# Patient Record
Sex: MALE | Race: WHITE | NOT HISPANIC OR LATINO | Employment: FULL TIME | URBAN - METROPOLITAN AREA
[De-identification: names, ages, dates, MRNs, and addresses within clinical notes are randomized per-mention and may not be internally consistent; named-entity substitution may affect disease eponyms.]

---

## 2018-03-28 PROBLEM — M47.817 SPONDYLOSIS OF LUMBOSACRAL REGION: Status: ACTIVE | Noted: 2018-03-28

## 2018-07-03 ENCOUNTER — OFFICE VISIT (OUTPATIENT)
Dept: URGENT CARE | Facility: CLINIC | Age: 28
End: 2018-07-03
Payer: COMMERCIAL

## 2018-07-03 VITALS
HEART RATE: 60 BPM | DIASTOLIC BLOOD PRESSURE: 66 MMHG | TEMPERATURE: 98 F | SYSTOLIC BLOOD PRESSURE: 107 MMHG | HEIGHT: 72 IN | RESPIRATION RATE: 18 BRPM | OXYGEN SATURATION: 98 % | WEIGHT: 215 LBS | BODY MASS INDEX: 29.12 KG/M2

## 2018-07-03 DIAGNOSIS — H10.9 CONJUNCTIVITIS OF LEFT EYE, UNSPECIFIED CONJUNCTIVITIS TYPE: Primary | ICD-10-CM

## 2018-07-03 PROCEDURE — 99203 OFFICE O/P NEW LOW 30 MIN: CPT | Mod: S$GLB,,, | Performed by: PHYSICIAN ASSISTANT

## 2018-07-03 PROCEDURE — 3008F BODY MASS INDEX DOCD: CPT | Mod: CPTII,S$GLB,, | Performed by: PHYSICIAN ASSISTANT

## 2018-07-03 RX ORDER — TOBRAMYCIN 3 MG/ML
1 SOLUTION/ DROPS OPHTHALMIC EVERY 4 HOURS
Qty: 5 ML | Refills: 0 | Status: SHIPPED | OUTPATIENT
Start: 2018-07-03 | End: 2018-07-06 | Stop reason: ALTCHOICE

## 2018-07-03 NOTE — PATIENT INSTRUCTIONS
EYE   1) Use the eye drops as prescribed while awake initially. Use the eye drops for 24 hours after the last day of eye symptoms.  You are contagious until you have been on abx for at least 24 hours.  2) Wash your hands regularly to help prevent the spread of infection.  3) It is also a good idea to change your pillow case each morning until you are symptom free to help prevent spread of infection.  4) Do not wear your contact lens (if you use them) for at least 5 days after you stop having symptoms and are rechecked by your doctor. Throw away the contacts, contact solution and carrying case you were using and start with new material. You may also need to throw away any eye makeup/mascara that you used while your eye was irritated.  5) If you develop worsening eye symptoms or change in your vision, seek medical care immediately, either with your ophthalomologist or the ER.  6) If your condition fails to improve in a timely manner, you should receive another evaluation by your Primary Care Provider/Pediatrician to discuss your concerns or return to urgent care for a recheck.  If your condition worsens at any time, you should report immediately to your nearest Emergency Department for further evaluation. **You must understand that you have received Urgent Care treatment only and that you may be released before all of your medical problems are known or treated. You, the patient, are responsible to arrange for follow-up care as instructed.       Conjunctivitis, Nonspecific    The membrane that covers the white part of your eye (the conjunctiva) is inflamed. Inflammation happens when your body responds to an injury, allergic reaction, infection, or illness. Symptoms of inflammation in the eye may include redness, irritation, itching, swelling, or burning. These symptoms should go away within the next 24 hours. Conjunctivitis may be related to a particle that was in your eye. If so, it may wash out with your tears or  irrigation treatment. Being exposed to liquid chemicals or fumes may also cause this reaction.   Home care  · Apply a cold pack (ice in a plastic bag, wrapped in a towel) over the eye for 20 minutes at a time. This will reduce pain.  · Artificial tears may be prescribed to reduce irritation or redness.  These should be used 3 to 4 times a day.  · You may use acetaminophen or ibuprofen to control pain, unless another medicine was prescribed.(Note: If you have chronic liver or kidney disease, or if you have ever had a stomach ulcer or gastrointestinal bleeding, talk with your healthcare provider before using these medicines.)  Follow-up care  Follow up with your healthcare provider, or as advised.  When to seek medical advice  Call your healthcare provider right away if any of these occur:  · Increased eyelid swelling  · Increased eye pain  · Increased redness or drainage from the eye  · Increased blurry vision or increased sensitivity to light  · Failure of normal vision to return within 24 to 48 hours  Date Last Reviewed: 6/14/2015  © 2732-4861 [employer]. [address] [place] [address], [place] [address]. All rights reserved. This information is not intended as a substitute for professional medical care. Always follow your healthcare professional's instructions.

## 2018-07-03 NOTE — PROGRESS NOTES
Subjective:       Patient ID: Seymour Brewer Jr. is a 27 y.o. male.    Vitals:  height is 6' (1.829 m) and weight is 97.5 kg (215 lb). His oral temperature is 97.8 °F (36.6 °C). His blood pressure is 107/66 and his pulse is 60. His respiration is 18 and oxygen saturation is 98%.     Chief Complaint: Eye Problem    Patient complains of left eye itching, irritation, redness, and drainage since yesterday.  He states that his eye was crusted shut this morning.  He also reports mild blurry vision.  Patient reports similar symptoms 3 weeks ago.  Patient denies any injury, trauma, or fb sensation.  He does not wear glasses or contacts.  Patient denies any other complaints at this time.        Eye Problem    The left eye is affected. This is a new problem. The current episode started yesterday. The problem occurs constantly. The problem has been gradually worsening. The pain is mild. There is no known exposure to pink eye. He does not wear contacts. Associated symptoms include blurred vision, an eye discharge, eye redness and itching. Pertinent negatives include no double vision, fever, foreign body sensation, nausea, photophobia, recent URI or vomiting. The treatment provided no relief.     Review of Systems   Constitution: Negative for chills and fever.   HENT: Negative for congestion.    Eyes: Positive for blurred vision, discharge, itching, pain (burning) and redness. Negative for double vision and photophobia.   Cardiovascular: Negative for chest pain.   Respiratory: Negative for shortness of breath.    Gastrointestinal: Negative for nausea and vomiting.   Neurological: Negative for headaches.   All other systems reviewed and are negative.      Objective:      Physical Exam   Constitutional: He is oriented to person, place, and time. He appears well-developed and well-nourished.   HENT:   Head: Normocephalic and atraumatic.   Right Ear: External ear normal.   Left Ear: External ear normal.   Nose: Nose normal.    Mouth/Throat: Oropharynx is clear and moist.   Eyes: EOM and lids are normal. Pupils are equal, round, and reactive to light. Lids are everted and swept, no foreign bodies found. Right eye exhibits no chemosis, no discharge, no exudate and no hordeolum. No foreign body present in the right eye. Left eye exhibits discharge (watery) and exudate (in lower lid). Left eye exhibits no chemosis and no hordeolum. No foreign body present in the left eye. Right conjunctiva is not injected. Right conjunctiva has no hemorrhage. Left conjunctiva is injected.   Slit lamp exam:       The left eye shows no corneal abrasion and no fluorescein uptake.   Neck: Trachea normal, full passive range of motion without pain and phonation normal. Neck supple.   Musculoskeletal: Normal range of motion.   Neurological: He is alert and oriented to person, place, and time.   Skin: Skin is warm, dry and intact.   Psychiatric: He has a normal mood and affect. His speech is normal and behavior is normal. Judgment and thought content normal. Cognition and memory are normal.   Nursing note and vitals reviewed.      Assessment:       1. Conjunctivitis of left eye, unspecified conjunctivitis type        Plan:         Conjunctivitis of left eye, unspecified conjunctivitis type  -     tobramycin sulfate 0.3% (TOBREX) 0.3 % ophthalmic solution; Place 1 drop into the left eye every 4 (four) hours. for 7 days  Dispense: 5 mL; Refill: 0      Patient Instructions   EYE   1) Use the eye drops as prescribed while awake initially. Use the eye drops for 24 hours after the last day of eye symptoms.  You are contagious until you have been on abx for at least 24 hours.  2) Wash your hands regularly to help prevent the spread of infection.  3) It is also a good idea to change your pillow case each morning until you are symptom free to help prevent spread of infection.  4) Do not wear your contact lens (if you use them) for at least 5 days after you stop having  symptoms and are rechecked by your doctor. Throw away the contacts, contact solution and carrying case you were using and start with new material. You may also need to throw away any eye makeup/mascara that you used while your eye was irritated.  5) If you develop worsening eye symptoms or change in your vision, seek medical care immediately, either with your ophthalomologist or the ER.  6) If your condition fails to improve in a timely manner, you should receive another evaluation by your Primary Care Provider/Pediatrician to discuss your concerns or return to urgent care for a recheck.  If your condition worsens at any time, you should report immediately to your nearest Emergency Department for further evaluation. **You must understand that you have received Urgent Care treatment only and that you may be released before all of your medical problems are known or treated. You, the patient, are responsible to arrange for follow-up care as instructed.       Conjunctivitis, Nonspecific    The membrane that covers the white part of your eye (the conjunctiva) is inflamed. Inflammation happens when your body responds to an injury, allergic reaction, infection, or illness. Symptoms of inflammation in the eye may include redness, irritation, itching, swelling, or burning. These symptoms should go away within the next 24 hours. Conjunctivitis may be related to a particle that was in your eye. If so, it may wash out with your tears or irrigation treatment. Being exposed to liquid chemicals or fumes may also cause this reaction.   Home care  · Apply a cold pack (ice in a plastic bag, wrapped in a towel) over the eye for 20 minutes at a time. This will reduce pain.  · Artificial tears may be prescribed to reduce irritation or redness.  These should be used 3 to 4 times a day.  · You may use acetaminophen or ibuprofen to control pain, unless another medicine was prescribed.(Note: If you have chronic liver or kidney disease, or  if you have ever had a stomach ulcer or gastrointestinal bleeding, talk with your healthcare provider before using these medicines.)  Follow-up care  Follow up with your healthcare provider, or as advised.  When to seek medical advice  Call your healthcare provider right away if any of these occur:  · Increased eyelid swelling  · Increased eye pain  · Increased redness or drainage from the eye  · Increased blurry vision or increased sensitivity to light  · Failure of normal vision to return within 24 to 48 hours  Date Last Reviewed: 6/14/2015  © 1437-2796 [employer]. [address] [place] [address], [place] [address]. All rights reserved. This information is not intended as a substitute for professional medical care. Always follow your healthcare professional's instructions.

## 2018-07-06 ENCOUNTER — TELEPHONE (OUTPATIENT)
Dept: URGENT CARE | Facility: CLINIC | Age: 28
End: 2018-07-06

## 2018-07-06 DIAGNOSIS — H10.9 CONJUNCTIVITIS OF BOTH EYES, UNSPECIFIED CONJUNCTIVITIS TYPE: Primary | ICD-10-CM

## 2018-07-06 RX ORDER — ERYTHROMYCIN 5 MG/G
OINTMENT OPHTHALMIC EVERY 6 HOURS
Qty: 3.5 G | Refills: 0 | Status: SHIPPED | OUTPATIENT
Start: 2018-07-06 | End: 2018-07-11

## 2018-07-06 NOTE — TELEPHONE ENCOUNTER
Patient has been using eye drops he was prescribed since his visit on Tuesday.  He has not noticed much improvement in his left eye and states it has now spread to his right eye.  Eyes are red, itchy, and draining.  Patient instructed to start Zyrtec 10 mg daily.  Will change from Tobramycin drops to Erythromycin Ointment.  If patient not getting any better by Monday, patient to follow-up with Optho. Patient agrees and v/u.

## 2018-07-07 ENCOUNTER — TELEPHONE (OUTPATIENT)
Dept: URGENT CARE | Facility: CLINIC | Age: 28
End: 2018-07-07

## 2018-07-21 ENCOUNTER — OFFICE VISIT (OUTPATIENT)
Dept: URGENT CARE | Facility: CLINIC | Age: 28
End: 2018-07-21
Payer: COMMERCIAL

## 2018-07-21 VITALS
SYSTOLIC BLOOD PRESSURE: 135 MMHG | OXYGEN SATURATION: 98 % | HEIGHT: 72 IN | DIASTOLIC BLOOD PRESSURE: 75 MMHG | WEIGHT: 215 LBS | RESPIRATION RATE: 16 BRPM | HEART RATE: 67 BPM | BODY MASS INDEX: 29.12 KG/M2 | TEMPERATURE: 98 F

## 2018-07-21 DIAGNOSIS — H10.9 CONJUNCTIVITIS, UNSPECIFIED CONJUNCTIVITIS TYPE, UNSPECIFIED LATERALITY: Primary | ICD-10-CM

## 2018-07-21 PROCEDURE — 99213 OFFICE O/P EST LOW 20 MIN: CPT | Mod: S$GLB,,, | Performed by: PHYSICIAN ASSISTANT

## 2018-07-21 PROCEDURE — 3008F BODY MASS INDEX DOCD: CPT | Mod: CPTII,S$GLB,, | Performed by: PHYSICIAN ASSISTANT

## 2018-07-21 RX ORDER — OLOPATADINE HYDROCHLORIDE 2 MG/ML
1 SOLUTION/ DROPS OPHTHALMIC DAILY
Qty: 2.5 ML | Refills: 0 | Status: SHIPPED | OUTPATIENT
Start: 2018-07-21 | End: 2019-11-11

## 2018-07-21 RX ORDER — ERYTHROMYCIN 5 MG/G
OINTMENT OPHTHALMIC EVERY 6 HOURS
Qty: 3.5 G | Refills: 0 | Status: SHIPPED | OUTPATIENT
Start: 2018-07-21 | End: 2018-07-26

## 2018-07-21 RX ORDER — CETIRIZINE HYDROCHLORIDE 10 MG/1
10 TABLET ORAL DAILY
Qty: 30 TABLET | Refills: 2 | Status: SHIPPED | OUTPATIENT
Start: 2018-07-21 | End: 2019-11-11

## 2018-07-21 NOTE — PATIENT INSTRUCTIONS
EYE   1) Use the eye drops as prescribed while awake initially. Use the eye drops for 24 hours after the last day of eye symptoms.  You are contagious until you have been on abx for at least 24 hours.  2) Wash your hands regularly to help prevent the spread of infection.  3) It is also a good idea to change your pillow case each morning until you are symptom free to help prevent spread of infection.  4) Do not wear your contact lens (if you use them) for at least 5 days after you stop having symptoms and are rechecked by your doctor. Throw away the contacts, contact solution and carrying case you were using and start with new material. You may also need to throw away any eye makeup/mascara that you used while your eye was irritated.  5) If you develop worsening eye symptoms or change in your vision, seek medical care immediately, either with your ophthalomologist or the ER.  6) If your condition fails to improve in a timely manner, you should receive another evaluation by your Primary Care Provider/Pediatrician to discuss your concerns or return to urgent care for a recheck.  If your condition worsens at any time, you should report immediately to your nearest Emergency Department for further evaluation. **You must understand that you have received Urgent Care treatment only and that you may be released before all of your medical problems are known or treated. You, the patient, are responsible to arrange for follow-up care as instructed.         Conjunctivitis, Nonspecific    The membrane that covers the white part of your eye (the conjunctiva) is inflamed. Inflammation happens when your body responds to an injury, allergic reaction, infection, or illness. Symptoms of inflammation in the eye may include redness, irritation, itching, swelling, or burning. These symptoms should go away within the next 24 hours. Conjunctivitis may be related to a particle that was in your eye. If so, it may wash out with your tears or  irrigation treatment. Being exposed to liquid chemicals or fumes may also cause this reaction.   Home care  · Apply a cold pack (ice in a plastic bag, wrapped in a towel) over the eye for 20 minutes at a time. This will reduce pain.  · Artificial tears may be prescribed to reduce irritation or redness.  These should be used 3 to 4 times a day.  · You may use acetaminophen or ibuprofen to control pain, unless another medicine was prescribed.(Note: If you have chronic liver or kidney disease, or if you have ever had a stomach ulcer or gastrointestinal bleeding, talk with your healthcare provider before using these medicines.)  Follow-up care  Follow up with your healthcare provider, or as advised.  When to seek medical advice  Call your healthcare provider right away if any of these occur:  · Increased eyelid swelling  · Increased eye pain  · Increased redness or drainage from the eye  · Increased blurry vision or increased sensitivity to light  · Failure of normal vision to return within 24 to 48 hours  Date Last Reviewed: 6/14/2015  © 1799-1505 [employer]. [address] [place] [address], [place] [address]. All rights reserved. This information is not intended as a substitute for professional medical care. Always follow your healthcare professional's instructions.

## 2018-07-21 NOTE — PROGRESS NOTES
Subjective:       Patient ID: Seymour Brewer Jr. is a 27 y.o. male.    Vitals:  height is 6' (1.829 m) and weight is 97.5 kg (215 lb). His oral temperature is 98.3 °F (36.8 °C). His blood pressure is 135/75 and his pulse is 67. His respiration is 16 and oxygen saturation is 98%.     Chief Complaint: Eye Problem (Bilateral right>Left)    Patient complains of eye itching and drainage for the past week or more.  Patient states that he had similar symptoms 17 days ago.  After using Erythromycin ointment, his symptoms improved.  He then went on vacation and was symptom free for a week.  After returning home, his symptoms started again.  He denies foreign body sensation or visual disturbances.  Patient denies any other complaints at this time.        Eye Problem    Both eyes are affected.This is a new problem. The current episode started 1 to 4 weeks ago. The problem occurs intermittently. The problem has been gradually worsening. There was no injury mechanism. The patient is experiencing no pain. Associated symptoms include an eye discharge (watery), eye redness and itching. Pertinent negatives include no blurred vision, double vision, fever, nausea, photophobia or vomiting. He has tried eye drops and water for the symptoms. The treatment provided no relief.     Review of Systems   Constitution: Negative for chills and fever.   HENT: Negative for congestion.    Eyes: Positive for discharge (watery), itching, pain and redness. Negative for blurred vision, double vision, photophobia, vision loss in left eye, vision loss in right eye, visual disturbance and visual halos.   Cardiovascular: Negative for chest pain.   Respiratory: Negative for shortness of breath.    Gastrointestinal: Negative for nausea and vomiting.   Neurological: Negative for headaches.   All other systems reviewed and are negative.      Objective:      Physical Exam   Constitutional: He is oriented to person, place, and time. He appears  well-developed and well-nourished. No distress.   HENT:   Head: Normocephalic and atraumatic.   Right Ear: External ear normal.   Left Ear: External ear normal.   Nose: Nose normal.   Mouth/Throat: Uvula is midline, oropharynx is clear and moist and mucous membranes are normal.   Eyes: Conjunctivae, EOM and lids are normal. Pupils are equal, round, and reactive to light. Lids are everted and swept, no foreign bodies found. Right eye exhibits discharge (watery). Right eye exhibits no exudate and no hordeolum. No foreign body present in the right eye. Left eye exhibits discharge (watery). Left eye exhibits no exudate and no hordeolum. No foreign body present in the left eye. No scleral icterus.   Neck: Normal range of motion. Neck supple.   Cardiovascular: Normal rate, regular rhythm and normal heart sounds.    Pulmonary/Chest: Effort normal and breath sounds normal. No respiratory distress.   Abdominal: Soft. Normal appearance and bowel sounds are normal. He exhibits no distension and no mass. There is no tenderness.   Musculoskeletal: Normal range of motion.   Neurological: He is alert and oriented to person, place, and time. He has normal strength. No cranial nerve deficit or sensory deficit.   Skin: Skin is warm. Capillary refill takes less than 2 seconds.   Psychiatric: He has a normal mood and affect. His speech is normal and behavior is normal. Judgment and thought content normal. Cognition and memory are normal.   Nursing note and vitals reviewed.      Assessment:       1. Conjunctivitis, unspecified conjunctivitis type, unspecified laterality        Plan:         Conjunctivitis, unspecified conjunctivitis type, unspecified laterality  -     erythromycin (ROMYCIN) ophthalmic ointment; Place into both eyes every 6 (six) hours. for 5 days  Dispense: 3.5 g; Refill: 0  -     olopatadine (PATADAY) 0.2 % Drop; Place 1 drop into both eyes once daily.  Dispense: 2.5 mL; Refill: 0  -     cetirizine (ZYRTEC) 10 MG  tablet; Take 1 tablet (10 mg total) by mouth once daily.  Dispense: 30 tablet; Refill: 2      Patient's symptoms appear to be allergic in nature but improved with Erythromycin ointment last time.  Will cover for both and have patient follow-up with Optho if symptoms not improved or worsening in 1 week.     Patient Instructions   EYE   1) Use the eye drops as prescribed while awake initially. Use the eye drops for 24 hours after the last day of eye symptoms.  You are contagious until you have been on abx for at least 24 hours.  2) Wash your hands regularly to help prevent the spread of infection.  3) It is also a good idea to change your pillow case each morning until you are symptom free to help prevent spread of infection.  4) Do not wear your contact lens (if you use them) for at least 5 days after you stop having symptoms and are rechecked by your doctor. Throw away the contacts, contact solution and carrying case you were using and start with new material. You may also need to throw away any eye makeup/mascara that you used while your eye was irritated.  5) If you develop worsening eye symptoms or change in your vision, seek medical care immediately, either with your ophthalomologist or the ER.  6) If your condition fails to improve in a timely manner, you should receive another evaluation by your Primary Care Provider/Pediatrician to discuss your concerns or return to urgent care for a recheck.  If your condition worsens at any time, you should report immediately to your nearest Emergency Department for further evaluation. **You must understand that you have received Urgent Care treatment only and that you may be released before all of your medical problems are known or treated. You, the patient, are responsible to arrange for follow-up care as instructed.         Conjunctivitis, Nonspecific    The membrane that covers the white part of your eye (the conjunctiva) is inflamed. Inflammation happens when your body  responds to an injury, allergic reaction, infection, or illness. Symptoms of inflammation in the eye may include redness, irritation, itching, swelling, or burning. These symptoms should go away within the next 24 hours. Conjunctivitis may be related to a particle that was in your eye. If so, it may wash out with your tears or irrigation treatment. Being exposed to liquid chemicals or fumes may also cause this reaction.   Home care  · Apply a cold pack (ice in a plastic bag, wrapped in a towel) over the eye for 20 minutes at a time. This will reduce pain.  · Artificial tears may be prescribed to reduce irritation or redness.  These should be used 3 to 4 times a day.  · You may use acetaminophen or ibuprofen to control pain, unless another medicine was prescribed.(Note: If you have chronic liver or kidney disease, or if you have ever had a stomach ulcer or gastrointestinal bleeding, talk with your healthcare provider before using these medicines.)  Follow-up care  Follow up with your healthcare provider, or as advised.  When to seek medical advice  Call your healthcare provider right away if any of these occur:  · Increased eyelid swelling  · Increased eye pain  · Increased redness or drainage from the eye  · Increased blurry vision or increased sensitivity to light  · Failure of normal vision to return within 24 to 48 hours  Date Last Reviewed: 6/14/2015  © 1091-0071 [employer]. [address] [place] [address], [place] [address]. All rights reserved. This information is not intended as a substitute for professional medical care. Always follow your healthcare professional's instructions.

## 2019-05-30 ENCOUNTER — TELEPHONE (OUTPATIENT)
Dept: ORTHOPEDICS | Facility: CLINIC | Age: 29
End: 2019-05-30

## 2019-05-30 DIAGNOSIS — M51.36 DDD (DEGENERATIVE DISC DISEASE), LUMBAR: Primary | ICD-10-CM

## 2019-07-12 ENCOUNTER — TELEPHONE (OUTPATIENT)
Dept: SPINE | Facility: CLINIC | Age: 29
End: 2019-07-12

## 2019-07-12 NOTE — TELEPHONE ENCOUNTER
Please call patient and schedule him to see Tawanna as a NP.  He missed his appointment with her recently.    JOSE Hoover, PA-C  Neurosurgery  [facility]  [facility]

## 2019-08-09 ENCOUNTER — INITIAL CONSULT (OUTPATIENT)
Dept: ORTHOPEDICS | Facility: CLINIC | Age: 29
End: 2019-08-09
Payer: COMMERCIAL

## 2019-08-09 VITALS — HEIGHT: 72 IN | BODY MASS INDEX: 32.73 KG/M2 | WEIGHT: 241.63 LBS

## 2019-08-09 DIAGNOSIS — G89.29 CHRONIC BILATERAL LOW BACK PAIN WITHOUT SCIATICA: ICD-10-CM

## 2019-08-09 DIAGNOSIS — M43.17 SPONDYLOLISTHESIS OF LUMBOSACRAL REGION: ICD-10-CM

## 2019-08-09 DIAGNOSIS — M54.50 CHRONIC BILATERAL LOW BACK PAIN WITHOUT SCIATICA: ICD-10-CM

## 2019-08-09 DIAGNOSIS — M43.00 SPONDYLOLYSIS: Primary | ICD-10-CM

## 2019-08-09 PROCEDURE — 99204 PR OFFICE/OUTPT VISIT, NEW, LEVL IV, 45-59 MIN: ICD-10-PCS | Mod: S$GLB,,, | Performed by: PHYSICIAN ASSISTANT

## 2019-08-09 PROCEDURE — 3008F PR BODY MASS INDEX (BMI) DOCUMENTED: ICD-10-PCS | Mod: CPTII,S$GLB,, | Performed by: PHYSICIAN ASSISTANT

## 2019-08-09 PROCEDURE — 99999 PR PBB SHADOW E&M-EST. PATIENT-LVL III: ICD-10-PCS | Mod: PBBFAC,,, | Performed by: PHYSICIAN ASSISTANT

## 2019-08-09 PROCEDURE — 99999 PR PBB SHADOW E&M-EST. PATIENT-LVL III: CPT | Mod: PBBFAC,,, | Performed by: PHYSICIAN ASSISTANT

## 2019-08-09 PROCEDURE — 3008F BODY MASS INDEX DOCD: CPT | Mod: CPTII,S$GLB,, | Performed by: PHYSICIAN ASSISTANT

## 2019-08-09 PROCEDURE — 99204 OFFICE O/P NEW MOD 45 MIN: CPT | Mod: S$GLB,,, | Performed by: PHYSICIAN ASSISTANT

## 2019-08-09 RX ORDER — DICLOFENAC SODIUM 75 MG/1
75 TABLET, DELAYED RELEASE ORAL 2 TIMES DAILY
Qty: 60 TABLET | Refills: 0 | Status: SHIPPED | OUTPATIENT
Start: 2019-08-09 | End: 2019-09-08

## 2019-08-09 RX ORDER — METHOCARBAMOL 750 MG/1
750 TABLET, FILM COATED ORAL 3 TIMES DAILY
Qty: 60 TABLET | Refills: 0 | Status: SHIPPED | OUTPATIENT
Start: 2019-08-09 | End: 2019-08-29

## 2019-08-09 NOTE — PROGRESS NOTES
DATE: 8/9/2019  PATIENT: Seymour Brewer Jr.    Supervising Physician: Pastor Maya M.D.    CHIEF COMPLAINT: back pain    HISTORY:  Seymour Brewer Jr. is a 28 y.o. male  offsre with PMH of chronic low back pain here for initial evaluation of low back pain (Back - 6). The pain has been present for 5-6 years.  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  He denies any history of back pain while playing football.  The patient describes the pain as sharp.  The pain is worse with bending and at night and improved by sitting. There is no associated numbness and tingling. There is no subjective weakness. Prior treatments have included ibuprofen, but no PT, ESIs or surgery.  He says he takes about 10 800mg ibuprofen a day.  This was prescribed by his PCP but she left and is no longer prescribing it for him.      The patient denies myelopathic symptoms such as handwriting changes or difficulty with buttons/coins/keys. Denies perineal paresthesias, bowel/bladder dysfunction.    PAST MEDICAL/SURGICAL HISTORY:  History reviewed. No pertinent past medical history.  Past Surgical History:   Procedure Laterality Date    MENISCECTOMY Left 2013       Current Medications:   Current Outpatient Medications:     cetirizine (ZYRTEC) 10 MG tablet, Take 1 tablet (10 mg total) by mouth once daily., Disp: 30 tablet, Rfl: 2    diclofenac (VOLTAREN) 75 MG EC tablet, Take 1 tablet (75 mg total) by mouth 2 (two) times daily., Disp: 60 tablet, Rfl: 0    methocarbamol (ROBAXIN) 750 MG Tab, Take 1 tablet (750 mg total) by mouth 3 (three) times daily. As needed for muscle spasms for 60 doses, Disp: 60 tablet, Rfl: 0    olopatadine (PATADAY) 0.2 % Drop, Place 1 drop into both eyes once daily., Disp: 2.5 mL, Rfl: 0    pramoxine-hydrocortisone (PROCTOCREAM-HC) 1-1 % rectal cream, Place rectally 3 (three) times daily as needed for Hemorrhoids., Disp: 28.4 g, Rfl: 1    Social History:   Social History      Socioeconomic History    Marital status:      Spouse name: Not on file    Number of children: Not on file    Years of education: Not on file    Highest education level: Not on file   Occupational History    Not on file   Social Needs    Financial resource strain: Not on file    Food insecurity:     Worry: Not on file     Inability: Not on file    Transportation needs:     Medical: Not on file     Non-medical: Not on file   Tobacco Use    Smoking status: Never Smoker    Smokeless tobacco: Never Used   Substance and Sexual Activity    Alcohol use: No    Drug use: No    Sexual activity: Not on file   Lifestyle    Physical activity:     Days per week: Not on file     Minutes per session: Not on file    Stress: Not on file   Relationships    Social connections:     Talks on phone: Not on file     Gets together: Not on file     Attends Scientologist service: Not on file     Active member of club or organization: Not on file     Attends meetings of clubs or organizations: Not on file     Relationship status: Not on file   Other Topics Concern    Not on file   Social History Narrative    Not on file       REVIEW OF SYSTEMS:  Constitution: Negative. Negative for chills, fever and night sweats.   Cardiovascular: Negative for chest pain and syncope.   Respiratory: Negative for cough and shortness of breath.   Gastrointestinal: See HPI. Negative for nausea/vomiting. Negative for abdominal pain.  Genitourinary: See HPI. Negative for discoloration or dysuria.  Skin: Negative for dry skin, itching and rash.   Hematologic/Lymphatic: Negative for bleeding problem. Does not bruise/bleed easily.   Musculoskeletal: Negative for falls and muscle weakness.   Neurological: See HPI. No seizures.   Endocrine: Negative for polydipsia, polyphagia and polyuria.   Allergic/Immunologic: Negative for hives and persistent infections.    PHYSICAL EXAMINATION:    Ht 6' (1.829 m)   Wt 109.6 kg (241 lb 10 oz)   BMI 32.77  kg/m²     General: The patient is a very pleasant 28 y.o. male in no apparent distress, the patient is oriented to person, place and time.   Psych: Normal mood and affect  HEENT: Vision grossly intact, hearing intact to the spoken word.  Lungs: Respirations unlabored.  Gait: Normal station and gait, no difficulty with toe or heel walk.   Skin: Dorsal lumbar skin negative for rashes, lesions, hairy patches and surgical scars.  Range of motion: Lumbar range of motion is acceptable. There is mild lumbar tenderness to palpation.  Spinal Balance: Global saggital and coronal spinal balance acceptable, no significant for scoliosis and kyphosis.  Musculoskeletal: No pain with the range of motion of the bilateral hips. No trochanteric tenderness to palpation.  Vascular: Bilateral lower extremities warm and well perfused, Dorsalis pedis pulses 2+ bilaterally.  Neurological: Normal strength and tone in all major motor groups in the bilateral lower extremities. Normal sensation to light touch in the L2-S1 dermatomes bilaterally.  Deep tendon reflexes symmetric 2+ in the bilateral lower extremities.  Negative Babinski bilaterally.  Straight leg raise positive on the left, reproduces back pain; negative on the right.  Single leg hyperextension positive on the left, negative on the right.      IMAGING:   Today I personally reviewed AP, Lat and Flex/Ex upright L-spine films that demonstrate grade I isthmic spondylolisthesis and vacuum disc at L5/S1       Body mass index is 32.77 kg/m².    No results found for: HGBA1C      ASSESSMENT/PLAN:    Seymour was seen today for low-back pain.    Diagnoses and all orders for this visit:    Spondylolysis  -     Ambulatory Referral to Physical/Occupational Therapy    Spondylolisthesis of lumbosacral region  -     Ambulatory Referral to Physical/Occupational Therapy    Chronic bilateral low back pain without sciatica    Other orders  -     diclofenac (VOLTAREN) 75 MG EC tablet; Take 1 tablet (75  mg total) by mouth 2 (two) times daily.  -     methocarbamol (ROBAXIN) 750 MG Tab; Take 1 tablet (750 mg total) by mouth 3 (three) times daily. As needed for muscle spasms for 60 doses      Today we discussed at length all of the different treatment options including anti-inflammatories, acetaminophen, rest, ice, heat, physical therapy including strengthening and stretching exercises, home exercises, ROM, aerobic conditioning, aqua therapy, other modalities including ultrasound, massage, and dry needling, epidural steroid injections and finally surgical intervention.      The patient has decided to proceed with physical therapy and medications.  Prescriptions for voltaren and robaxin sent to the pharmacy.  He will not take any other NSAIDs with voltaren.  Patient understands and agrees.  Referral for PT placed today at [facility].  Follow up after therapy if symptoms persist.  We will consider an MRI at that time.          Follow up if symptoms worsen or fail to improve.

## 2019-08-12 ENCOUNTER — PATIENT MESSAGE (OUTPATIENT)
Dept: ORTHOPEDICS | Facility: CLINIC | Age: 29
End: 2019-08-12

## 2021-03-11 ENCOUNTER — OFFICE VISIT (OUTPATIENT)
Dept: URGENT CARE | Facility: CLINIC | Age: 31
End: 2021-03-11
Payer: COMMERCIAL

## 2021-03-11 VITALS
HEART RATE: 100 BPM | RESPIRATION RATE: 19 BRPM | SYSTOLIC BLOOD PRESSURE: 126 MMHG | HEIGHT: 71 IN | BODY MASS INDEX: 33.6 KG/M2 | WEIGHT: 240 LBS | DIASTOLIC BLOOD PRESSURE: 78 MMHG | TEMPERATURE: 98 F | OXYGEN SATURATION: 99 %

## 2021-03-11 DIAGNOSIS — S01.112A LACERATION OF LEFT EYEBROW, INITIAL ENCOUNTER: Primary | ICD-10-CM

## 2021-03-11 PROCEDURE — 3008F PR BODY MASS INDEX (BMI) DOCUMENTED: ICD-10-PCS | Mod: CPTII,S$GLB,, | Performed by: PHYSICIAN ASSISTANT

## 2021-03-11 PROCEDURE — 12013 RPR F/E/E/N/L/M 2.6-5.0 CM: CPT | Mod: S$GLB,,, | Performed by: PHYSICIAN ASSISTANT

## 2021-03-11 PROCEDURE — 99214 OFFICE O/P EST MOD 30 MIN: CPT | Mod: 25,S$GLB,, | Performed by: PHYSICIAN ASSISTANT

## 2021-03-11 PROCEDURE — 12013 LACERATION REPAIR: ICD-10-PCS | Mod: S$GLB,,, | Performed by: PHYSICIAN ASSISTANT

## 2021-03-11 PROCEDURE — 1125F PR PAIN SEVERITY QUANTIFIED, PAIN PRESENT: ICD-10-PCS | Mod: S$GLB,,, | Performed by: PHYSICIAN ASSISTANT

## 2021-03-11 PROCEDURE — 3008F BODY MASS INDEX DOCD: CPT | Mod: CPTII,S$GLB,, | Performed by: PHYSICIAN ASSISTANT

## 2021-03-11 PROCEDURE — 99214 PR OFFICE/OUTPT VISIT, EST, LEVL IV, 30-39 MIN: ICD-10-PCS | Mod: 25,S$GLB,, | Performed by: PHYSICIAN ASSISTANT

## 2021-03-11 PROCEDURE — 1125F AMNT PAIN NOTED PAIN PRSNT: CPT | Mod: S$GLB,,, | Performed by: PHYSICIAN ASSISTANT

## 2021-05-06 ENCOUNTER — PATIENT MESSAGE (OUTPATIENT)
Dept: RESEARCH | Facility: HOSPITAL | Age: 31
End: 2021-05-06

## 2021-05-21 ENCOUNTER — OCCUPATIONAL HEALTH (OUTPATIENT)
Dept: URGENT CARE | Facility: CLINIC | Age: 31
End: 2021-05-21

## 2021-05-21 VITALS
RESPIRATION RATE: 16 BRPM | OXYGEN SATURATION: 99 % | BODY MASS INDEX: 31.15 KG/M2 | SYSTOLIC BLOOD PRESSURE: 122 MMHG | HEART RATE: 60 BPM | DIASTOLIC BLOOD PRESSURE: 81 MMHG | TEMPERATURE: 97 F | HEIGHT: 72 IN | WEIGHT: 230 LBS

## 2021-05-21 DIAGNOSIS — Z02.83 ENCOUNTER FOR DRUG SCREENING: Primary | ICD-10-CM

## 2021-05-21 DIAGNOSIS — Z02.1 ENCOUNTER FOR PRE-EMPLOYMENT EXAMINATION: ICD-10-CM

## 2021-05-21 PROCEDURE — 99499 UNLISTED E&M SERVICE: CPT | Mod: S$GLB,,, | Performed by: NURSE PRACTITIONER

## 2021-05-21 PROCEDURE — 99499 PHYSICAL - BASIC COMPLEXITY: ICD-10-PCS | Mod: S$GLB,,, | Performed by: NURSE PRACTITIONER

## 2021-05-21 PROCEDURE — 80305 DRUG TEST PRSMV DIR OPT OBS: CPT | Mod: S$GLB,,, | Performed by: INTERNAL MEDICINE

## 2021-05-21 PROCEDURE — 80305 OOH NON-DOT DRUG SCREEN: ICD-10-PCS | Mod: S$GLB,,, | Performed by: INTERNAL MEDICINE

## 2022-02-10 ENCOUNTER — PATIENT MESSAGE (OUTPATIENT)
Dept: ADMINISTRATIVE | Facility: HOSPITAL | Age: 32
End: 2022-02-10
Payer: COMMERCIAL

## 2022-10-03 ENCOUNTER — PATIENT MESSAGE (OUTPATIENT)
Dept: ADMINISTRATIVE | Facility: HOSPITAL | Age: 32
End: 2022-10-03
Payer: COMMERCIAL